# Patient Record
Sex: MALE | Race: WHITE | HISPANIC OR LATINO | Employment: FULL TIME | URBAN - METROPOLITAN AREA
[De-identification: names, ages, dates, MRNs, and addresses within clinical notes are randomized per-mention and may not be internally consistent; named-entity substitution may affect disease eponyms.]

---

## 2023-09-19 ENCOUNTER — OFFICE VISIT (OUTPATIENT)
Dept: PRIMARY CARE | Facility: CLINIC | Age: 48
End: 2023-09-19
Payer: COMMERCIAL

## 2023-09-19 ENCOUNTER — LAB (OUTPATIENT)
Dept: LAB | Facility: LAB | Age: 48
End: 2023-09-19
Payer: COMMERCIAL

## 2023-09-19 VITALS
BODY MASS INDEX: 31.94 KG/M2 | WEIGHT: 229 LBS | TEMPERATURE: 98.2 F | DIASTOLIC BLOOD PRESSURE: 84 MMHG | SYSTOLIC BLOOD PRESSURE: 130 MMHG

## 2023-09-19 DIAGNOSIS — R53.83 FATIGUE, UNSPECIFIED TYPE: Primary | ICD-10-CM

## 2023-09-19 DIAGNOSIS — R53.83 FATIGUE, UNSPECIFIED TYPE: ICD-10-CM

## 2023-09-19 PROCEDURE — 1036F TOBACCO NON-USER: CPT | Performed by: FAMILY MEDICINE

## 2023-09-19 PROCEDURE — 36415 COLL VENOUS BLD VENIPUNCTURE: CPT

## 2023-09-19 PROCEDURE — 84403 ASSAY OF TOTAL TESTOSTERONE: CPT

## 2023-09-19 PROCEDURE — 99213 OFFICE O/P EST LOW 20 MIN: CPT | Performed by: FAMILY MEDICINE

## 2023-09-19 PROCEDURE — 84402 ASSAY OF FREE TESTOSTERONE: CPT

## 2023-09-19 RX ORDER — DOXYCYCLINE 40 MG/1
CAPSULE ORAL
COMMUNITY
End: 2023-11-13 | Stop reason: ALTCHOICE

## 2023-09-19 NOTE — PROGRESS NOTES
Subjective   Patient ID: Dwight Regan is a 48 y.o. male who presents for Knne pain (EP. Here for Bi lateral knee pain at times can't walk. Recently had strenuous activity with activity.).  HPI  Both knees have been hurting  Started step challenge at work, then started jogging including two 5Ks in one weekend    Cut grass yesterday and then knee was throbbing  Objective   Visit Vitals  /84   Temp 36.8 °C (98.2 °F) (Oral)      Physical Exam    Assessment/Plan   There are no diagnoses linked to this encounter.    Rec ibuprofen 600-800 mg bid-tid;  Call Dr. Jose Conway (ortho) to get in for further evaluation     Denice Giles MD  Family Medicine   [facility]

## 2023-09-25 LAB
TESTOSTERONE FREE (CHAN): 65.1 PG/ML (ref 35–155)
TESTOSTERONE,TOTAL,LC-MS/MS: 561 NG/DL (ref 250–1100)

## 2023-10-21 PROBLEM — L91.8 CUTANEOUS SKIN TAGS: Status: ACTIVE | Noted: 2023-10-21

## 2023-10-21 PROBLEM — J40 SINOBRONCHITIS: Status: ACTIVE | Noted: 2023-10-21

## 2023-10-21 PROBLEM — M25.559 ARTHRALGIA OF HIP: Status: ACTIVE | Noted: 2023-10-21

## 2023-10-21 PROBLEM — H54.7 VISION LOSS: Status: ACTIVE | Noted: 2023-10-21

## 2023-10-21 PROBLEM — R21 RASH: Status: ACTIVE | Noted: 2023-10-21

## 2023-10-21 PROBLEM — L30.9 DERMATITIS: Status: ACTIVE | Noted: 2023-10-21

## 2023-10-21 PROBLEM — E66.812 CLASS 2 OBESITY WITH BODY MASS INDEX (BMI) OF 35.0 TO 35.9 IN ADULT: Status: ACTIVE | Noted: 2023-10-21

## 2023-10-21 PROBLEM — E78.5 HYPERLIPIDEMIA: Status: ACTIVE | Noted: 2023-10-21

## 2023-10-21 PROBLEM — H53.8 BLURRY VISION, RIGHT EYE: Status: ACTIVE | Noted: 2020-02-04

## 2023-10-21 PROBLEM — M70.61 GREATER TROCHANTERIC BURSITIS OF RIGHT HIP: Status: ACTIVE | Noted: 2023-10-21

## 2023-10-21 PROBLEM — J32.9 SINOBRONCHITIS: Status: ACTIVE | Noted: 2023-10-21

## 2023-10-21 PROBLEM — M54.30 SCIATICA: Status: ACTIVE | Noted: 2023-10-21

## 2023-10-21 PROBLEM — N50.819 TESTICLE PAIN: Status: ACTIVE | Noted: 2023-10-21

## 2023-10-21 PROBLEM — M25.551 LATERAL PAIN OF RIGHT HIP: Status: ACTIVE | Noted: 2023-10-21

## 2023-10-21 PROBLEM — E66.9 CLASS 2 OBESITY WITH BODY MASS INDEX (BMI) OF 35.0 TO 35.9 IN ADULT: Status: ACTIVE | Noted: 2023-10-21

## 2023-10-21 PROBLEM — L98.9 SKIN LESION OF BACK: Status: ACTIVE | Noted: 2023-10-21

## 2023-10-21 PROBLEM — B02.9 SHINGLES: Status: ACTIVE | Noted: 2023-10-21

## 2023-10-21 PROBLEM — M26.629 TMJ TENDERNESS: Status: ACTIVE | Noted: 2023-10-21

## 2023-10-21 PROBLEM — M54.50 LOW BACK PAIN: Status: ACTIVE | Noted: 2023-10-21

## 2023-10-21 PROBLEM — R51.9 HEADACHE: Status: ACTIVE | Noted: 2023-10-21

## 2023-10-21 PROBLEM — R42 DIZZINESS: Status: ACTIVE | Noted: 2020-02-04

## 2023-10-21 RX ORDER — SULINDAC 200 MG/1
1 TABLET ORAL
COMMUNITY
Start: 2022-05-10

## 2023-10-21 RX ORDER — ATORVASTATIN CALCIUM 20 MG/1
1 TABLET, FILM COATED ORAL DAILY
COMMUNITY
Start: 2020-02-15

## 2023-10-21 RX ORDER — MUPIROCIN 20 MG/G
1 OINTMENT TOPICAL 3 TIMES DAILY
COMMUNITY
Start: 2022-07-29 | End: 2023-11-13 | Stop reason: ALTCHOICE

## 2023-10-21 RX ORDER — FLUOXETINE HYDROCHLORIDE 20 MG/1
CAPSULE ORAL DAILY
COMMUNITY
Start: 2015-03-13

## 2023-10-21 RX ORDER — ATORVASTATIN CALCIUM 40 MG/1
40 TABLET, FILM COATED ORAL DAILY
COMMUNITY
Start: 2020-02-06

## 2023-10-21 RX ORDER — ASPIRIN 81 MG/1
81 TABLET ORAL DAILY
COMMUNITY
Start: 2020-02-06

## 2023-10-21 RX ORDER — TOBRAMYCIN 3 MG/ML
2 SOLUTION/ DROPS OPHTHALMIC 4 TIMES DAILY
COMMUNITY

## 2023-10-21 RX ORDER — VENLAFAXINE HYDROCHLORIDE 37.5 MG/1
1 CAPSULE, EXTENDED RELEASE ORAL DAILY
COMMUNITY
Start: 2015-05-13

## 2023-10-21 RX ORDER — HYDROXYZINE HYDROCHLORIDE 10 MG/1
10 TABLET, FILM COATED ORAL EVERY 8 HOURS PRN
COMMUNITY
Start: 2018-01-23

## 2023-10-23 PROBLEM — B02.9 SHINGLES: Status: RESOLVED | Noted: 2023-10-21 | Resolved: 2023-10-23

## 2023-10-23 PROBLEM — R21 RASH: Status: RESOLVED | Noted: 2023-10-21 | Resolved: 2023-10-23

## 2023-10-23 PROBLEM — J40 SINOBRONCHITIS: Status: RESOLVED | Noted: 2023-10-21 | Resolved: 2023-10-23

## 2023-10-23 PROBLEM — N50.819 TESTICLE PAIN: Status: RESOLVED | Noted: 2023-10-21 | Resolved: 2023-10-23

## 2023-10-23 PROBLEM — J32.9 SINOBRONCHITIS: Status: RESOLVED | Noted: 2023-10-21 | Resolved: 2023-10-23

## 2023-10-24 ENCOUNTER — OFFICE VISIT (OUTPATIENT)
Dept: ORTHOPEDIC SURGERY | Facility: HOSPITAL | Age: 48
End: 2023-10-24
Payer: COMMERCIAL

## 2023-10-24 VITALS — WEIGHT: 229 LBS | HEIGHT: 71 IN | BODY MASS INDEX: 32.06 KG/M2

## 2023-10-24 DIAGNOSIS — M76.01 GLUTEAL TENDINITIS OF RIGHT BUTTOCK: ICD-10-CM

## 2023-10-24 DIAGNOSIS — M70.61 GREATER TROCHANTERIC BURSITIS OF RIGHT HIP: Primary | ICD-10-CM

## 2023-10-24 DIAGNOSIS — M25.551 PAIN OF RIGHT HIP: ICD-10-CM

## 2023-10-24 PROCEDURE — 99214 OFFICE O/P EST MOD 30 MIN: CPT | Performed by: ORTHOPAEDIC SURGERY

## 2023-10-24 PROCEDURE — 1036F TOBACCO NON-USER: CPT | Performed by: ORTHOPAEDIC SURGERY

## 2023-10-26 NOTE — PROGRESS NOTES
Subjective    Patient ID: Dwight Regan is a 48 y.o. male.    Chief Complaint: Injections of the Right Hip (Pt previously received R GT injections-AG)         HPI  Patient presents today for follow-up evaluation of right hip pain.  The pain is located over the lateral aspect of the hip.  I saw him in March with similar complaint.  Symptoms at the time was concerning for trochanteric bursitis.  I recommended conservative treatment.  I referred him to physical therapy which he participated without significant improvement.  I also perform corticosteroid injection into the trochanteric bursa at that time.  He reported that this does not provide significant relief.  He is having difficulty laying on the lateral aspect of the right hip.  Objective   Ortho Exam  Gen: The patient is alert and oriented ×3, is in no acute distress, and appear their stated age and weight.    Patient is able to ambulate with a mild antalgic gait .  Examination of the hip reveals no skin abnormalities.  There was tenderness to palpation over the greater trochanter and the insertion of the gluteus tendon at the trochanter.  Range of motion of the hip reveals flexion past 90 degrees, patient has full extension, 40 degrees of hip abduction, 30 degrees of abduction, internal rotation to 20 degrees and external rotation to 45 degrees.  There is no pain with rotational range of motion of the hip.    Image Results:  I personally reviewed right hip radiograph that reveals normal preservation of joint space in the hip.  On the lateral aspect there is a slight area of calcification of the gluteal tendon.    Assessment/Plan   Encounter Diagnoses:  Greater trochanteric bursitis of right hip    Pain of right hip    Gluteal tendinitis of right buttock    Orders Placed This Encounter    MR hip right wo IV contrast   Patient presents today with lateral hip pain.  No evidence of degenerative changes and no current pain.  Localized laterally over the hip.  I  discussed with him this to be persistent trochanteric bursitis however we need to exclude gluteus medius tendon tear.  I explained to him that the only way we can diagnosed this is with an MRI to better evaluate.  Patient is interested in having the MRI to evaluate.  MRI was ordered.  Patient will call to schedule the MRI and as soon as it is completed she will come back to see me and we will discuss treatment options which include possible repeat injection versus referral for surgical repair.  However  No follow-ups on file.

## 2023-11-03 ENCOUNTER — APPOINTMENT (OUTPATIENT)
Dept: RADIOLOGY | Facility: CLINIC | Age: 48
End: 2023-11-03
Payer: COMMERCIAL

## 2023-11-07 ENCOUNTER — APPOINTMENT (OUTPATIENT)
Dept: RADIOLOGY | Facility: CLINIC | Age: 48
End: 2023-11-07
Payer: COMMERCIAL

## 2023-11-13 ENCOUNTER — OFFICE VISIT (OUTPATIENT)
Dept: ORTHOPEDIC SURGERY | Facility: CLINIC | Age: 48
End: 2023-11-13
Payer: COMMERCIAL

## 2023-11-13 VITALS — BODY MASS INDEX: 32.06 KG/M2 | WEIGHT: 229 LBS | HEIGHT: 71 IN

## 2023-11-13 DIAGNOSIS — M54.31 SCIATICA OF RIGHT SIDE: Primary | ICD-10-CM

## 2023-11-13 DIAGNOSIS — M70.61 GREATER TROCHANTERIC BURSITIS OF RIGHT HIP: ICD-10-CM

## 2023-11-13 PROCEDURE — 99214 OFFICE O/P EST MOD 30 MIN: CPT | Performed by: ORTHOPAEDIC SURGERY

## 2023-11-13 PROCEDURE — 2500000005 HC RX 250 GENERAL PHARMACY W/O HCPCS: Performed by: ORTHOPAEDIC SURGERY

## 2023-11-13 PROCEDURE — 2500000004 HC RX 250 GENERAL PHARMACY W/ HCPCS (ALT 636 FOR OP/ED): Performed by: ORTHOPAEDIC SURGERY

## 2023-11-13 PROCEDURE — 1036F TOBACCO NON-USER: CPT | Performed by: ORTHOPAEDIC SURGERY

## 2023-11-13 PROCEDURE — 20610 DRAIN/INJ JOINT/BURSA W/O US: CPT | Performed by: ORTHOPAEDIC SURGERY

## 2023-11-13 RX ORDER — GABAPENTIN 100 MG/1
100 CAPSULE ORAL 3 TIMES DAILY
Qty: 90 CAPSULE | Refills: 0 | Status: SHIPPED | OUTPATIENT
Start: 2023-11-13 | End: 2023-12-13

## 2023-11-13 RX ORDER — TRIAMCINOLONE ACETONIDE 40 MG/ML
40 INJECTION, SUSPENSION INTRA-ARTICULAR; INTRAMUSCULAR
Status: COMPLETED | OUTPATIENT
Start: 2023-11-13 | End: 2023-11-13

## 2023-11-13 RX ORDER — LIDOCAINE HYDROCHLORIDE 10 MG/ML
4 INJECTION INFILTRATION; PERINEURAL
Status: COMPLETED | OUTPATIENT
Start: 2023-11-13 | End: 2023-11-13

## 2023-11-13 RX ADMIN — TRIAMCINOLONE ACETONIDE 40 MG: 40 INJECTION, SUSPENSION INTRA-ARTICULAR; INTRAMUSCULAR at 20:43

## 2023-11-13 RX ADMIN — LIDOCAINE HYDROCHLORIDE 4 ML: 10 INJECTION, SOLUTION INFILTRATION; PERINEURAL at 20:43

## 2023-11-14 NOTE — PROGRESS NOTES
Subjective    Patient ID: Dwight Regan is a 48 y.o. male.    Chief Complaint: Follow-up of the Right Hip (Outside MRI Review; Disc Uploaded-)         HPI  Patient presents today for follow-up evaluation of right hip pain.  The pain is located over the lateral aspect of the hip and right buttock.  I saw him in March with similar complaint.  Symptoms at the time was concerning for trochanteric bursitis.  I recommended conservative treatment.  I referred him to physical therapy which he participated without significant improvement.  I also perform corticosteroid injection into the trochanteric bursa at that time.  The injection that he received was effective but the second was not.  The last time I saw him given significant complaint we elected to proceed we will get an MRI of the hip.  This was performed at an outside facility and he brought a disc with the images on it today.  Today he states that the pain mostly is now over the buttock area and radiate over the lateral aspect of the thigh into the lateral aspect of the leg but does not into the foot.  The pain is worse after prolonged sitting especially in the car ride.  No numbness or tingling the leg.  No weakness in the leg.      Objective   Ortho Exam  Gen: The patient is alert and oriented ×3, is in no acute distress, and appear their stated age and weight.    Patient is able to ambulate with a mild antalgic gait .  Examination of the hip reveals no skin abnormalities.  There was tenderness to palpation over the greater trochanter and the insertion of the gluteus tendon at the trochanter.  He points to the buttock area as also a source of his pain.  Range of motion of the hip reveals flexion past 90 degrees, patient has full extension, 40 degrees of hip abduction, 30 degrees of abduction, internal rotation to 20 degrees and external rotation to 45 degrees.  There is no pain with rotational range of motion of the hip.    Image Results:  I personally  reviewed right hip MRI that he brought with him.  The MRI was reviewed of the was very limited as he was restricted to only a very small section of the hip.  Based upon my review the labrum appears to be intact.  No significant articular cartilage damage.  There is no increase signal changes in the surrounding soft tissue.  Did not have an associated radiology read with the MRI.    Assessment/Plan   Encounter Diagnoses:  Sciatica of right side    Greater trochanteric bursitis of right hip  Presents today with trochanteric bursitis and right sciatica symptoms.  I discussed with him the sciatica symptoms likely is secondary to nerve compression in the spine or possibly about performance.  I told him that this is latter etiology is rare.  I recommended conservative treatment.  I gave him prescription for gabapentin to be taken as needed for neuropathic pain.  If he develops any progressive neurologic issues I recommend that he be evaluated in our spine [place].  This we discussed performing a repeat corticosteroid injection to the trochanteric area.  Patient was interested in having this done today.  Patient will follow-up with me as needed future.  Patient ID: Dwight Regna is a 48 y.o. male.    L Inj/Asp: R greater trochanteric bursa on 11/13/2023 8:43 PM  Indications: pain  Details: 20 G needle, lateral approach  Medications: 40 mg triamcinolone acetonide 40 mg/mL; 4 mL lidocaine 10 mg/mL (1 %)  Procedure, treatment alternatives, risks and benefits explained, specific risks discussed. Immediately prior to procedure a time out was called to verify the correct patient, procedure, equipment, support staff and site/side marked as required. Patient was prepped and draped in the usual sterile fashion.         Orders Placed This Encounter    gabapentin (Neurontin) 100 mg capsule

## 2024-02-01 DIAGNOSIS — E78.5 HYPERLIPIDEMIA, UNSPECIFIED HYPERLIPIDEMIA TYPE: ICD-10-CM

## 2024-02-16 ENCOUNTER — APPOINTMENT (OUTPATIENT)
Dept: PRIMARY CARE | Facility: CLINIC | Age: 49
End: 2024-02-16
Payer: COMMERCIAL

## 2024-02-27 ENCOUNTER — OFFICE VISIT (OUTPATIENT)
Dept: PRIMARY CARE | Facility: CLINIC | Age: 49
End: 2024-02-27
Payer: COMMERCIAL

## 2024-02-27 ENCOUNTER — TELEPHONE (OUTPATIENT)
Dept: PRIMARY CARE | Facility: CLINIC | Age: 49
End: 2024-02-27

## 2024-02-27 ENCOUNTER — LAB (OUTPATIENT)
Dept: LAB | Facility: LAB | Age: 49
End: 2024-02-27
Payer: COMMERCIAL

## 2024-02-27 DIAGNOSIS — K52.9 GASTROENTERITIS: Primary | ICD-10-CM

## 2024-02-27 DIAGNOSIS — Z00.00 HEALTH MAINTENANCE EXAMINATION: ICD-10-CM

## 2024-02-27 PROCEDURE — 84443 ASSAY THYROID STIM HORMONE: CPT

## 2024-02-27 PROCEDURE — 99213 OFFICE O/P EST LOW 20 MIN: CPT | Performed by: FAMILY MEDICINE

## 2024-02-27 PROCEDURE — 80053 COMPREHEN METABOLIC PANEL: CPT

## 2024-02-27 PROCEDURE — 80061 LIPID PANEL: CPT

## 2024-02-27 PROCEDURE — 82306 VITAMIN D 25 HYDROXY: CPT

## 2024-02-27 PROCEDURE — 84153 ASSAY OF PSA TOTAL: CPT

## 2024-02-27 PROCEDURE — 36415 COLL VENOUS BLD VENIPUNCTURE: CPT

## 2024-02-27 PROCEDURE — 1036F TOBACCO NON-USER: CPT | Performed by: FAMILY MEDICINE

## 2024-02-27 PROCEDURE — 85025 COMPLETE CBC W/AUTO DIFF WBC: CPT

## 2024-02-27 NOTE — PROGRESS NOTES
"Subjective   Patient ID: Dwight Regan is a 48 y.o. male who presents for Follow-up (EP. Here for Follow up on Hospital discharge for gastroenteritis from food. ).  HPI  Acute onset of vomitting and then diarrhea on 2/15/24  Stayed in hospital for 2 nights  \"Infection in small intestine\"  Discharged on metronidazole tid and Cipro   Appetite is slowly returning  No abd pain or fever    Saw spine specialist, ortho (Dr. Jose Conway), and neurologist - no explanation for his right hip pain  Now back to chiropractor and doing traction and wants to see how this works out for him     Review of Systems  Genl: The patient has been in good health without recent weight change, fevers, or night sweats  CVS: No chest pain, irregular heartbeat, shortness of breath, or swollen ankles  Resp: No cough or difficulty breathing  GI: No change in bowel habits or abdominal pain. No heartburn  : No urinary problems.   Objective   There were no vitals taken for this visit.   Physical Exam  Alert, well-appearing.    Neck: Supple. No palpable masses. Thyroid was not enlarged.    CVS: RRR, no murmurs.     Respiratory: Clear and equal breath sounds.    GI: Soft, nontender, no masses or hepatosplenomegaly.     Assessment/Plan   Diagnoses and all orders for this visit:  Gastroenteritis  Health maintenance examination  -     CBC and Auto Differential; Future  -     Comprehensive Metabolic Panel; Future  -     TSH with reflex to Free T4 if abnormal; Future  -     Lipid Panel; Future  -     Prostate Specific Antigen, Screen; Future  -     Vitamin D 25-Hydroxy,Total (for eval of Vitamin D levels); Future      Referral to [facility] (screening colonoscopy)      Denice Giles MD  Family Medicine   [facility]  "

## 2024-02-28 LAB
25(OH)D3 SERPL-MCNC: 13 NG/ML (ref 30–100)
ALBUMIN SERPL BCP-MCNC: 4.7 G/DL (ref 3.4–5)
ALP SERPL-CCNC: 42 U/L (ref 33–120)
ALT SERPL W P-5'-P-CCNC: 21 U/L (ref 10–52)
ANION GAP SERPL CALC-SCNC: 13 MMOL/L (ref 10–20)
AST SERPL W P-5'-P-CCNC: 20 U/L (ref 9–39)
BASOPHILS # BLD AUTO: 0.08 X10*3/UL (ref 0–0.1)
BASOPHILS NFR BLD AUTO: 1.1 %
BILIRUB SERPL-MCNC: 0.5 MG/DL (ref 0–1.2)
BUN SERPL-MCNC: 7 MG/DL (ref 6–23)
CALCIUM SERPL-MCNC: 9.7 MG/DL (ref 8.6–10.6)
CHLORIDE SERPL-SCNC: 104 MMOL/L (ref 98–107)
CHOLEST SERPL-MCNC: 175 MG/DL (ref 0–199)
CHOLESTEROL/HDL RATIO: 4.2
CO2 SERPL-SCNC: 29 MMOL/L (ref 21–32)
CREAT SERPL-MCNC: 1.1 MG/DL (ref 0.5–1.3)
EGFRCR SERPLBLD CKD-EPI 2021: 82 ML/MIN/1.73M*2
EOSINOPHIL # BLD AUTO: 0.16 X10*3/UL (ref 0–0.7)
EOSINOPHIL NFR BLD AUTO: 2.2 %
ERYTHROCYTE [DISTWIDTH] IN BLOOD BY AUTOMATED COUNT: 12.6 % (ref 11.5–14.5)
GLUCOSE SERPL-MCNC: 102 MG/DL (ref 74–99)
HCT VFR BLD AUTO: 46.1 % (ref 41–52)
HDLC SERPL-MCNC: 41.7 MG/DL
HGB BLD-MCNC: 15.7 G/DL (ref 13.5–17.5)
IMM GRANULOCYTES # BLD AUTO: 0.01 X10*3/UL (ref 0–0.7)
IMM GRANULOCYTES NFR BLD AUTO: 0.1 % (ref 0–0.9)
LDLC SERPL CALC-MCNC: 105 MG/DL
LYMPHOCYTES # BLD AUTO: 2.16 X10*3/UL (ref 1.2–4.8)
LYMPHOCYTES NFR BLD AUTO: 30 %
MCH RBC QN AUTO: 31 PG (ref 26–34)
MCHC RBC AUTO-ENTMCNC: 34.1 G/DL (ref 32–36)
MCV RBC AUTO: 91 FL (ref 80–100)
MONOCYTES # BLD AUTO: 0.54 X10*3/UL (ref 0.1–1)
MONOCYTES NFR BLD AUTO: 7.5 %
NEUTROPHILS # BLD AUTO: 4.25 X10*3/UL (ref 1.2–7.7)
NEUTROPHILS NFR BLD AUTO: 59.1 %
NON HDL CHOLESTEROL: 133 MG/DL (ref 0–149)
NRBC BLD-RTO: 0 /100 WBCS (ref 0–0)
PLATELET # BLD AUTO: 389 X10*3/UL (ref 150–450)
POTASSIUM SERPL-SCNC: 4.6 MMOL/L (ref 3.5–5.3)
PROT SERPL-MCNC: 7 G/DL (ref 6.4–8.2)
PSA SERPL-MCNC: 0.3 NG/ML
RBC # BLD AUTO: 5.07 X10*6/UL (ref 4.5–5.9)
SODIUM SERPL-SCNC: 141 MMOL/L (ref 136–145)
TRIGL SERPL-MCNC: 143 MG/DL (ref 0–149)
TSH SERPL-ACNC: 2.08 MIU/L (ref 0.44–3.98)
VLDL: 29 MG/DL (ref 0–40)
WBC # BLD AUTO: 7.2 X10*3/UL (ref 4.4–11.3)

## 2024-03-20 ENCOUNTER — OFFICE (OUTPATIENT)
Dept: URBAN - METROPOLITAN AREA CLINIC 27 | Facility: CLINIC | Age: 49
End: 2024-03-20

## 2024-03-20 VITALS
WEIGHT: 220 LBS | TEMPERATURE: 98.1 F | DIASTOLIC BLOOD PRESSURE: 81 MMHG | HEART RATE: 95 BPM | SYSTOLIC BLOOD PRESSURE: 133 MMHG | HEIGHT: 71 IN

## 2024-03-20 DIAGNOSIS — Z12.11 ENCOUNTER FOR SCREENING FOR MALIGNANT NEOPLASM OF COLON: ICD-10-CM

## 2024-03-20 PROCEDURE — 99203 OFFICE O/P NEW LOW 30 MIN: CPT | Performed by: INTERNAL MEDICINE

## 2024-09-12 ENCOUNTER — OFFICE VISIT (OUTPATIENT)
Dept: PRIMARY CARE | Facility: CLINIC | Age: 49
End: 2024-09-12
Payer: COMMERCIAL

## 2024-09-12 VITALS
HEIGHT: 71 IN | WEIGHT: 227.9 LBS | SYSTOLIC BLOOD PRESSURE: 110 MMHG | RESPIRATION RATE: 18 BRPM | TEMPERATURE: 98.4 F | BODY MASS INDEX: 31.9 KG/M2 | DIASTOLIC BLOOD PRESSURE: 72 MMHG | HEART RATE: 76 BPM

## 2024-09-12 DIAGNOSIS — R50.9 FEVER, UNSPECIFIED FEVER CAUSE: ICD-10-CM

## 2024-09-12 DIAGNOSIS — R11.10 ACUTE VOMITING: ICD-10-CM

## 2024-09-12 DIAGNOSIS — B00.9 HERPES SIMPLEX: Primary | ICD-10-CM

## 2024-09-12 PROCEDURE — 3008F BODY MASS INDEX DOCD: CPT | Performed by: FAMILY MEDICINE

## 2024-09-12 PROCEDURE — 99213 OFFICE O/P EST LOW 20 MIN: CPT | Performed by: FAMILY MEDICINE

## 2024-09-12 PROCEDURE — 87635 SARS-COV-2 COVID-19 AMP PRB: CPT

## 2024-09-12 RX ORDER — VALACYCLOVIR HYDROCHLORIDE 1 G/1
TABLET, FILM COATED ORAL
Qty: 28 TABLET | Refills: 0 | Status: SHIPPED | OUTPATIENT
Start: 2024-09-12

## 2024-09-12 ASSESSMENT — PATIENT HEALTH QUESTIONNAIRE - PHQ9
5. POOR APPETITE OR OVEREATING: NOT AT ALL
8. MOVING OR SPEAKING SO SLOWLY THAT OTHER PEOPLE COULD HAVE NOTICED. OR THE OPPOSITE, BEING SO FIGETY OR RESTLESS THAT YOU HAVE BEEN MOVING AROUND A LOT MORE THAN USUAL: NOT AT ALL
3. TROUBLE FALLING OR STAYING ASLEEP OR SLEEPING TOO MUCH: SEVERAL DAYS
9. THOUGHTS THAT YOU WOULD BE BETTER OFF DEAD, OR OF HURTING YOURSELF: NOT AT ALL
1. LITTLE INTEREST OR PLEASURE IN DOING THINGS: MORE THAN HALF THE DAYS
SUM OF ALL RESPONSES TO PHQ9 QUESTIONS 1 AND 2: 3
2. FEELING DOWN, DEPRESSED OR HOPELESS: SEVERAL DAYS
10. IF YOU CHECKED OFF ANY PROBLEMS, HOW DIFFICULT HAVE THESE PROBLEMS MADE IT FOR YOU TO DO YOUR WORK, TAKE CARE OF THINGS AT HOME, OR GET ALONG WITH OTHER PEOPLE: SOMEWHAT DIFFICULT
4. FEELING TIRED OR HAVING LITTLE ENERGY: NEARLY EVERY DAY
6. FEELING BAD ABOUT YOURSELF - OR THAT YOU ARE A FAILURE OR HAVE LET YOURSELF OR YOUR FAMILY DOWN: NEARLY EVERY DAY
7. TROUBLE CONCENTRATING ON THINGS, SUCH AS READING THE NEWSPAPER OR WATCHING TELEVISION: NEARLY EVERY DAY
SUM OF ALL RESPONSES TO PHQ QUESTIONS 1-9: 13

## 2024-09-12 NOTE — PROGRESS NOTES
"Subjective   Patient ID: Dwight Regan is a 49 y.o. male who presents for Nausea (Vomiting, fever,cold sores  started yesterday).  HPI  Started developing cold sore- worst one he has ever had, in the past has used topical (Abreva)  Flew to [place] Tuesday and came home early due to illness- acute onset of vomitting ~ 12 h after eating and vomitted foodstuff and then vomitted a couple more times    Had temp (101) last night     Objective   /72   Pulse 76   Temp 36.9 °C (98.4 °F)   Resp 18   Ht 1.803 m (5' 11\")   Wt 103 kg (227 lb 14.4 oz)   BMI 31.79 kg/m²    Physical Exam  Alert, well-appearing.    HEENT: + blisters on lower lip. OP clear. Sclera white. Pupils equal and round. EACs and TMs clear.    Neck: Supple. No LAD.    CVS: RRR, no murmurs.     Respiratory: Clear and equal breath sounds.    GI: Soft, nontender, no masses or hepatosplenomegaly.     Assessment/Plan   Diagnoses and all orders for this visit:  Herpes simplex  -     valACYclovir (Valtrex) 1 gram tablet; Take 2 tabs 12 hours apart for one day (so 4 tabs per episode)  Fever, unspecified fever cause  -     Sars-CoV-2 PCR  Acute vomiting  -     Sars-CoV-2 PCR      Denice Giles MD  Family Medicine   [facility]  "

## 2024-09-13 LAB — SARS-COV-2 RNA RESP QL NAA+PROBE: NOT DETECTED

## 2024-10-14 PROBLEM — K63.5 POLYP OF COLON: Status: ACTIVE | Noted: 2024-10-14

## 2024-10-14 PROBLEM — K57.30 DIVERTICULOSIS OF LARGE INTESTINE WITHOUT PERFORATION OR ABS: Status: ACTIVE | Noted: 2024-10-14

## 2024-11-07 DIAGNOSIS — B00.9 HERPES SIMPLEX: ICD-10-CM

## 2024-11-07 RX ORDER — VALACYCLOVIR HYDROCHLORIDE 1 G/1
TABLET, FILM COATED ORAL
Qty: 28 TABLET | Refills: 0 | Status: SHIPPED | OUTPATIENT
Start: 2024-11-07

## 2024-11-07 NOTE — TELEPHONE ENCOUNTER
Spoke w/patient, needs refill on  Valacyclovir 1 gram - out of pill  Send to [facility]  Last appt. 9/12/24  Upcoming appt. None  Last BW 2/27/24

## 2024-12-24 ENCOUNTER — OFFICE VISIT (OUTPATIENT)
Dept: URGENT CARE | Age: 49
End: 2024-12-24
Payer: COMMERCIAL

## 2024-12-24 VITALS
RESPIRATION RATE: 16 BRPM | OXYGEN SATURATION: 97 % | DIASTOLIC BLOOD PRESSURE: 73 MMHG | BODY MASS INDEX: 31.38 KG/M2 | TEMPERATURE: 97 F | SYSTOLIC BLOOD PRESSURE: 125 MMHG | HEART RATE: 93 BPM | WEIGHT: 225 LBS

## 2024-12-24 DIAGNOSIS — J06.9 VIRAL URI: Primary | ICD-10-CM

## 2024-12-24 DIAGNOSIS — J02.9 SORE THROAT: ICD-10-CM

## 2024-12-24 LAB
POC RAPID INFLUENZA A: NEGATIVE
POC RAPID INFLUENZA B: NEGATIVE
POC RAPID STREP: NEGATIVE
POC SARS-COV-2 AG BINAX: NORMAL

## 2024-12-24 PROCEDURE — 87804 INFLUENZA ASSAY W/OPTIC: CPT | Performed by: PHYSICIAN ASSISTANT

## 2024-12-24 PROCEDURE — 87811 SARS-COV-2 COVID19 W/OPTIC: CPT | Performed by: PHYSICIAN ASSISTANT

## 2024-12-24 PROCEDURE — 87651 STREP A DNA AMP PROBE: CPT

## 2024-12-24 PROCEDURE — 87880 STREP A ASSAY W/OPTIC: CPT | Performed by: PHYSICIAN ASSISTANT

## 2024-12-24 PROCEDURE — 99203 OFFICE O/P NEW LOW 30 MIN: CPT | Performed by: PHYSICIAN ASSISTANT

## 2024-12-24 ASSESSMENT — ENCOUNTER SYMPTOMS
JOINT SWELLING: 0
EYE PAIN: 0
COUGH: 0
ARTHRALGIAS: 0
FEVER: 0
RHINORRHEA: 0
ABDOMINAL PAIN: 0
FATIGUE: 0
SINUS PAIN: 0
SORE THROAT: 1
NAUSEA: 0
EYE ITCHING: 0
CHILLS: 1
EYE DISCHARGE: 0
VOMITING: 0
EYE REDNESS: 0
DIARRHEA: 0
SHORTNESS OF BREATH: 0
CHEST TIGHTNESS: 0

## 2024-12-24 NOTE — PATIENT INSTRUCTIONS
Take OTC cold meds, rest, hydration  Will call for positive test result  F/U with PCP for lingering symptoms  ER for red flags

## 2024-12-24 NOTE — PROGRESS NOTES
Subjective   Patient ID: Dwight Regan is a 49 y.o. male. They present today with a chief complaint of strep (Daughter + with strep, achy joints, sore throat x 1 day).    History of Present Illness  Pt reports noticed body aches and sore throat since last night. Daughter just tested positive for strep this morning. He took DayQuil this morning with minimal relief. Denies hx of chronic lung disease.           Past Medical History  Allergies as of 12/24/2024 - Reviewed 12/24/2024   Allergen Reaction Noted    Epinephrine Other 04/22/2015    Gabapentin Unknown 10/21/2023    Penicillins Unknown 10/21/2023       (Not in a hospital admission)       Past Medical History:   Diagnosis Date    Other allergy status, other than to drugs and biological substances     History of environmental allergies    Rash 10/21/2023    Sinobronchitis 10/21/2023    Testicle pain 10/21/2023       History reviewed. No pertinent surgical history.     reports that he has been smoking cigarettes and cigars. He has never used smokeless tobacco. He reports current drug use. Drug: Marijuana. He reports that he does not drink alcohol.    Review of Systems  Review of Systems   Constitutional:  Positive for chills. Negative for fatigue and fever.        Body aches   HENT:  Positive for sore throat. Negative for ear discharge, ear pain, rhinorrhea, sinus pain and sneezing.    Eyes:  Negative for pain, discharge, redness and itching.   Respiratory:  Negative for cough, chest tightness and shortness of breath.    Cardiovascular:  Negative for chest pain.   Gastrointestinal:  Negative for abdominal pain, diarrhea, nausea and vomiting.   Musculoskeletal:  Negative for arthralgias and joint swelling.   Skin:  Negative for rash.                                  Objective    Vitals:    12/24/24 1146   BP: 125/73   Pulse: 93   Resp: 16   Temp: 36.1 °C (97 °F)   SpO2: 97%   Weight: 102 kg (225 lb)     No LMP for male patient.    Physical Exam  Constitutional:        Appearance: Normal appearance.   HENT:      Head: Normocephalic and atraumatic.      Right Ear: Tympanic membrane, ear canal and external ear normal.      Left Ear: Tympanic membrane, ear canal and external ear normal.      Nose: No congestion or rhinorrhea.      Mouth/Throat:      Pharynx: Posterior oropharyngeal erythema present. No oropharyngeal exudate.   Cardiovascular:      Rate and Rhythm: Normal rate and regular rhythm.      Heart sounds: No murmur heard.  Pulmonary:      Effort: Pulmonary effort is normal. No respiratory distress.      Breath sounds: No stridor. No wheezing, rhonchi or rales.   Lymphadenopathy:      Cervical: No cervical adenopathy.   Skin:     General: Skin is warm and dry.   Neurological:      Mental Status: He is alert and oriented to person, place, and time.   Psychiatric:         Mood and Affect: Mood normal.         Procedures    Point of Care Test & Imaging Results from this visit  Results for orders placed or performed in visit on 12/24/24   POCT rapid strep A manually resulted   Result Value Ref Range    POC Rapid Strep Negative Negative   POCT Covid-19 Rapid Antigen   Result Value Ref Range    POC CHRISTINE-COV-2 AG  Presumptive negative test for SARS-CoV-2 (no antigen detected)     Presumptive negative test for SARS-CoV-2 (no antigen detected)   POCT Influenza A/B manually resulted   Result Value Ref Range    POC Rapid Influenza A Negative Negative    POC Rapid Influenza B Negative Negative      No results found.    Diagnostic study results (if any) were reviewed by Leyla Whiting PA-C.    Assessment/Plan   Allergies, medications, history, and pertinent labs/EKGs/Imaging reviewed by Leyla Whiting PA-C.     Medical Decision Making  Afebrile, mild pharyngeal erythema, rapid COVID, Flu, Strep all negative  Strep PCR sent due to concerns for exposure  No signs of medical emergency condition    Orders and Diagnoses  Diagnoses and all orders for this visit:  Viral URI  -     POCT Covid-19  Rapid Antigen  -     POCT Influenza A/B manually resulted  Sore throat  -     POCT rapid strep A manually resulted  -     Group A Streptococcus, PCR      Medical Admin Record      Patient disposition: Home    Electronically signed by Leyla Whiting PA-C  12:03 PM

## 2024-12-25 LAB — S PYO DNA THROAT QL NAA+PROBE: NOT DETECTED

## 2024-12-27 ENCOUNTER — TELEPHONE (OUTPATIENT)
Dept: PRIMARY CARE | Facility: CLINIC | Age: 49
End: 2024-12-27
Payer: COMMERCIAL

## 2024-12-27 ENCOUNTER — OFFICE VISIT (OUTPATIENT)
Dept: URGENT CARE | Age: 49
End: 2024-12-27

## 2024-12-27 VITALS
WEIGHT: 225 LBS | TEMPERATURE: 98.2 F | SYSTOLIC BLOOD PRESSURE: 111 MMHG | HEART RATE: 84 BPM | RESPIRATION RATE: 16 BRPM | BODY MASS INDEX: 31.38 KG/M2 | DIASTOLIC BLOOD PRESSURE: 70 MMHG | OXYGEN SATURATION: 99 %

## 2024-12-27 DIAGNOSIS — J02.9 SORE THROAT: ICD-10-CM

## 2024-12-27 DIAGNOSIS — J02.9 PHARYNGITIS, UNSPECIFIED ETIOLOGY: Primary | ICD-10-CM

## 2024-12-27 LAB — POC RAPID STREP: NEGATIVE

## 2024-12-27 RX ORDER — METHYLPREDNISOLONE 4 MG/1
TABLET ORAL
Qty: 21 TABLET | Refills: 0 | Status: SHIPPED | OUTPATIENT
Start: 2024-12-27

## 2024-12-27 ASSESSMENT — ENCOUNTER SYMPTOMS
RHINORRHEA: 0
CHEST TIGHTNESS: 0
SORE THROAT: 1
EYE DISCHARGE: 0
ARTHRALGIAS: 0
DIARRHEA: 0
EYE PAIN: 0
COUGH: 0
EYE ITCHING: 0
NAUSEA: 0
VOMITING: 0
FATIGUE: 0
SINUS PAIN: 0
SHORTNESS OF BREATH: 0
EYE REDNESS: 0
CHILLS: 0
ABDOMINAL PAIN: 0
FEVER: 0
JOINT SWELLING: 0

## 2024-12-27 ASSESSMENT — PAIN SCALES - GENERAL: PAINLEVEL_OUTOF10: 7

## 2024-12-27 NOTE — TELEPHONE ENCOUNTER
At Urgent care now. States his throat is so sore and can't swallow and has been exposed to daughter and father who both had strep.  He also barely has a voice.   He will check back with me on Monday on how he feels.

## 2024-12-27 NOTE — TELEPHONE ENCOUNTER
Pt called stating he was seen in the  Urgent Care with sx of all over body aches, sore throat, cough.  He was tested for Flu, Covid and Strep, they all came back negative.       He's looking for advise.  Please advise pt.

## 2024-12-27 NOTE — TELEPHONE ENCOUNTER
What is he looking for advice about? Likely a viral URI that needs to run its course- rest, fluids, ibuprofen; if not better needs to be seen

## 2024-12-27 NOTE — PROGRESS NOTES
Subjective   Patient ID: Dwight Regan is a 49 y.o. male. They present today with a chief complaint of Sore Throat (Continuing ST X 4 days. Pt requests strep testing. ).    History of Present Illness  Pt was assessed 3days ago with similar chief complaints. Exposure to strep but all of the tests returned with negative results including strep PCR. NO fever, chills. He is taking Motrin with minimal relief.       Sore Throat   Pertinent negatives include no abdominal pain, coughing, diarrhea, ear discharge, ear pain, shortness of breath or vomiting.       Past Medical History  Allergies as of 12/27/2024 - Reviewed 12/27/2024   Allergen Reaction Noted    Epinephrine Other 04/22/2015    Gabapentin Unknown 10/21/2023    Penicillins Unknown 10/21/2023       (Not in a hospital admission)       Past Medical History:   Diagnosis Date    Other allergy status, other than to drugs and biological substances     History of environmental allergies    Rash 10/21/2023    Sinobronchitis 10/21/2023    Testicle pain 10/21/2023       No past surgical history on file.     reports that he has been smoking cigarettes and cigars. He has never used smokeless tobacco. He reports current drug use. Drug: Marijuana. He reports that he does not drink alcohol.    Review of Systems  Review of Systems   Constitutional:  Negative for chills, fatigue and fever.   HENT:  Positive for sore throat. Negative for ear discharge, ear pain, rhinorrhea, sinus pain and sneezing.    Eyes:  Negative for pain, discharge, redness and itching.   Respiratory:  Negative for cough, chest tightness and shortness of breath.    Cardiovascular:  Negative for chest pain.   Gastrointestinal:  Negative for abdominal pain, diarrhea, nausea and vomiting.   Musculoskeletal:  Negative for arthralgias and joint swelling.   Skin:  Negative for rash.                                  Objective    Vitals:    12/27/24 1655   BP: 111/70   Pulse: 84   Resp: 16   Temp: 36.8 °C (98.2 °F)    SpO2: 99%   Weight: 102 kg (225 lb)     No LMP for male patient.    Physical Exam  Constitutional:       Appearance: Normal appearance.   HENT:      Head: Normocephalic and atraumatic.      Right Ear: Tympanic membrane, ear canal and external ear normal.      Left Ear: Tympanic membrane, ear canal and external ear normal.      Nose: No congestion or rhinorrhea.      Mouth/Throat:      Pharynx: Posterior oropharyngeal erythema present.   Cardiovascular:      Rate and Rhythm: Normal rate and regular rhythm.      Heart sounds: No murmur heard.  Pulmonary:      Effort: Pulmonary effort is normal. No respiratory distress.      Breath sounds: No stridor. No wheezing, rhonchi or rales.   Lymphadenopathy:      Cervical: No cervical adenopathy.   Skin:     General: Skin is warm and dry.   Neurological:      Mental Status: He is alert and oriented to person, place, and time.   Psychiatric:         Mood and Affect: Mood normal.         Procedures    Point of Care Test & Imaging Results from this visit  Results for orders placed or performed in visit on 12/27/24   POCT rapid strep A manually resulted   Result Value Ref Range    POC Rapid Strep Negative Negative      No results found.    Diagnostic study results (if any) were reviewed by Leyla Whiting PA-C.    Assessment/Plan   Allergies, medications, history, and pertinent labs/EKGs/Imaging reviewed by Leyla Whiting PA-C.     Medical Decision Making  Mild pharyngeal erythema, repeated strep test still negative  Strep less likely based on negative rapid and Pcr test results  Will treat with short course of steroid but recommended follow up visit with PCP for lingering symptoms    Orders and Diagnoses  Diagnoses and all orders for this visit:  Pharyngitis, unspecified etiology  -     methylPREDNISolone (Medrol Dospak) 4 mg tablets; Follow schedule on package instructions  Sore throat  -     POCT rapid strep A manually resulted      Medical Admin Record      Patient disposition:  Home    Electronically signed by Leyla Whiting PA-C  5:24 PM

## 2025-01-06 ENCOUNTER — APPOINTMENT (OUTPATIENT)
Dept: PRIMARY CARE | Facility: CLINIC | Age: 50
End: 2025-01-06
Payer: COMMERCIAL

## 2025-06-03 ENCOUNTER — OFFICE (OUTPATIENT)
Dept: URBAN - METROPOLITAN AREA CLINIC 26 | Facility: CLINIC | Age: 50
End: 2025-06-03
Payer: COMMERCIAL

## 2025-06-03 VITALS
SYSTOLIC BLOOD PRESSURE: 132 MMHG | HEART RATE: 66 BPM | SYSTOLIC BLOOD PRESSURE: 131 MMHG | DIASTOLIC BLOOD PRESSURE: 85 MMHG | WEIGHT: 232 LBS | HEIGHT: 71 IN | TEMPERATURE: 98.1 F | DIASTOLIC BLOOD PRESSURE: 103 MMHG

## 2025-06-03 DIAGNOSIS — K62.89 OTHER SPECIFIED DISEASES OF ANUS AND RECTUM: ICD-10-CM

## 2025-06-03 DIAGNOSIS — K64.9 UNSPECIFIED HEMORRHOIDS: ICD-10-CM

## 2025-06-03 DIAGNOSIS — Z86.0100 PERSONAL HISTORY OF COLON POLYPS, UNSPECIFIED: ICD-10-CM

## 2025-06-03 PROCEDURE — 99213 OFFICE O/P EST LOW 20 MIN: CPT

## 2025-06-03 RX ORDER — HYDROCORTISONE 25 MG/G
5 CREAM TOPICAL
Qty: 30 | Refills: 1 | Status: ACTIVE
Start: 2025-06-03

## 2025-06-26 ENCOUNTER — APPOINTMENT (OUTPATIENT)
Dept: PRIMARY CARE | Facility: CLINIC | Age: 50
End: 2025-06-26
Payer: COMMERCIAL

## 2025-06-26 VITALS
TEMPERATURE: 98.4 F | HEART RATE: 78 BPM | SYSTOLIC BLOOD PRESSURE: 120 MMHG | BODY MASS INDEX: 32.7 KG/M2 | WEIGHT: 233.6 LBS | RESPIRATION RATE: 18 BRPM | HEIGHT: 71 IN | OXYGEN SATURATION: 98 % | DIASTOLIC BLOOD PRESSURE: 70 MMHG

## 2025-06-26 DIAGNOSIS — Z12.5 PROSTATE CANCER SCREENING: ICD-10-CM

## 2025-06-26 DIAGNOSIS — R53.83 FATIGUE, UNSPECIFIED TYPE: Primary | ICD-10-CM

## 2025-06-26 DIAGNOSIS — Z13.220 SCREENING CHOLESTEROL LEVEL: ICD-10-CM

## 2025-06-26 DIAGNOSIS — E55.9 VITAMIN D DEFICIENCY: ICD-10-CM

## 2025-06-26 PROBLEM — R11.2 NAUSEA AND VOMITING: Status: ACTIVE | Noted: 2024-02-15

## 2025-06-26 PROBLEM — R10.84 GENERALIZED ABDOMINAL PAIN: Status: ACTIVE | Noted: 2024-02-15

## 2025-06-26 PROBLEM — M26.629 TENDERNESS OF TEMPOROMANDIBULAR JOINT: Status: ACTIVE | Noted: 2025-06-26

## 2025-06-26 PROBLEM — L98.9 INFLAMMATORY DERMATOSIS: Status: ACTIVE | Noted: 2025-06-26

## 2025-06-26 PROBLEM — J32.9 CHRONIC SINUSITIS: Status: ACTIVE | Noted: 2025-06-26

## 2025-06-26 PROBLEM — B02.9 HERPES ZOSTER: Status: ACTIVE | Noted: 2025-06-26

## 2025-06-26 PROBLEM — H10.30 ACUTE INFECTIVE CONJUNCTIVITIS: Status: ACTIVE | Noted: 2025-06-26

## 2025-06-26 PROBLEM — H54.7 VISUAL IMPAIRMENT: Status: ACTIVE | Noted: 2025-06-26

## 2025-06-26 PROBLEM — L91.8 SKIN TAG: Status: ACTIVE | Noted: 2025-06-26

## 2025-06-26 PROBLEM — R19.7 DIARRHEA: Status: ACTIVE | Noted: 2024-02-15

## 2025-06-26 PROBLEM — R21 RASH: Status: ACTIVE | Noted: 2025-06-26

## 2025-06-26 PROBLEM — N50.819 PAIN IN TESTICLE: Status: ACTIVE | Noted: 2025-06-26

## 2025-06-26 PROBLEM — M70.60 GREATER TROCHANTERIC BURSITIS: Status: ACTIVE | Noted: 2025-06-26

## 2025-06-26 PROBLEM — K56.609 SMALL BOWEL OBSTRUCTION (MULTI): Status: ACTIVE | Noted: 2024-02-15

## 2025-06-26 PROBLEM — E66.9 OBESITY: Status: ACTIVE | Noted: 2025-06-26

## 2025-06-26 PROBLEM — L98.9 SKIN LESION: Status: ACTIVE | Noted: 2025-06-26

## 2025-06-26 PROCEDURE — 99213 OFFICE O/P EST LOW 20 MIN: CPT | Performed by: FAMILY MEDICINE

## 2025-06-26 PROCEDURE — 3008F BODY MASS INDEX DOCD: CPT | Performed by: FAMILY MEDICINE

## 2025-06-26 ASSESSMENT — ENCOUNTER SYMPTOMS
DEPRESSION: 0
OCCASIONAL FEELINGS OF UNSTEADINESS: 0
LOSS OF SENSATION IN FEET: 0

## 2025-06-26 ASSESSMENT — PATIENT HEALTH QUESTIONNAIRE - PHQ9
1. LITTLE INTEREST OR PLEASURE IN DOING THINGS: NOT AT ALL
SUM OF ALL RESPONSES TO PHQ9 QUESTIONS 1 AND 2: 1
2. FEELING DOWN, DEPRESSED OR HOPELESS: SEVERAL DAYS

## 2025-06-26 NOTE — PROGRESS NOTES
"Subjective   Patient ID: Dwight Regan is a 50 y.o. male who presents for Follow-up (EPV, F/U Fatigue, Feeling sluggish/Requests testosterone testing).    History of Present Illness  Dwight Regan is a 50 year old male who presents with fatigue and difficulty losing weight.    He has been experiencing persistent fatigue and difficulty losing weight despite regular exercise and a healthy diet for the past six months. His weight has unexpectedly increased, and he feels exhausted by 2:00 PM despite going to bed around 9:30 to 10:00 PM. He works with a  three times a week, which he finds physically exhausting but beneficial.    He recently visited a men's health clinic where blood tests were conducted to check testosterone levels, estrogen levels, and vitamin deficiencies. He has not yet received the results from these tests. His testosterone levels were previously checked in September 2023. He has a family history of his father experiencing a drop in testosterone levels around age 50.    He occasionally takes Valtrex for tingling sensations that precede cold sore outbreaks, with the last use being three months ago.     He has a history of low vitamin D levels, last checked in March 2024, but has not been taking vitamin D supplements.    No significant changes in bowel habits, chest pain, or shortness of breath. He reports occasional snoring, particularly when extremely tired, but denies any known episodes of apnea or gasping during sleep. He does not feel rested upon waking.    He has experienced some depression and anxiety, which he attributes to job-related stress and recent changes in employment. 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    Objective     /70 (BP Location: Right arm, Patient Position: Sitting)   Pulse 78   Temp 36.9 °C (98.4 °F) (Oral)   Resp 18   Ht 1.803 m (5' 10.98\")   Wt 106 kg (233 lb 9.6 oz)   SpO2 98%   BMI 32.60 kg/m²  "     Assessment/Plan     Diagnoses and all orders for this visit:  Fatigue, unspecified type  -     Vitamin D 25-Hydroxy,Total (for eval of Vitamin D levels); Future  -     CBC and Auto Differential; Future  -     Comprehensive Metabolic Panel; Future  -     TSH with reflex to Free T4 if abnormal; Future  -     Testosterone, total and free; Future  Vitamin D deficiency  -     Vitamin D 25-Hydroxy,Total (for eval of Vitamin D levels); Future  Screening cholesterol level  -     Lipid Panel; Future  Prostate cancer screening  -     Prostate Specific Antigen, Screen; Future    Assessment & Plan      Denice Giles MD    This medical note was created with the assistance of artificial intelligence (AI) for documentation purposes. The content has been reviewed and confirmed by the healthcare provider for accuracy and completeness. Patient consented to the use of audio recording and use of AI during their visit.

## 2025-10-03 ENCOUNTER — APPOINTMENT (OUTPATIENT)
Dept: PRIMARY CARE | Facility: CLINIC | Age: 50
End: 2025-10-03
Payer: COMMERCIAL